# Patient Record
Sex: FEMALE | Race: WHITE | NOT HISPANIC OR LATINO | Employment: UNEMPLOYED | URBAN - METROPOLITAN AREA
[De-identification: names, ages, dates, MRNs, and addresses within clinical notes are randomized per-mention and may not be internally consistent; named-entity substitution may affect disease eponyms.]

---

## 2017-01-04 ENCOUNTER — HISTORICAL (OUTPATIENT)
Dept: WOUND CARE | Facility: HOSPITAL | Age: 27
End: 2017-01-04

## 2017-11-13 ENCOUNTER — HISTORICAL (OUTPATIENT)
Dept: LAB | Facility: HOSPITAL | Age: 27
End: 2017-11-13

## 2017-11-13 LAB
ABS NEUT (OLG): 3.1 X10(3)/MCL (ref 1.5–6.9)
ALBUMIN SERPL-MCNC: 3.9 GM/DL (ref 3.4–5)
ALBUMIN/GLOB SERPL: 1.1 RATIO
ALP SERPL-CCNC: 68 UNIT/L (ref 30–113)
ALT SERPL-CCNC: 42 UNIT/L (ref 10–45)
APPEARANCE, UA: CLEAR
AST SERPL-CCNC: 26 UNIT/L (ref 15–37)
BILIRUB SERPL-MCNC: 0.4 MG/DL (ref 0.1–0.9)
BILIRUB UR QL STRIP: NEGATIVE
BILIRUBIN DIRECT+TOT PNL SERPL-MCNC: 0.1 MG/DL (ref 0–0.3)
BILIRUBIN DIRECT+TOT PNL SERPL-MCNC: 0.3 MG/DL
BUN SERPL-MCNC: 10 MG/DL (ref 10–20)
CALCIUM SERPL-MCNC: 9.4 MG/DL (ref 8–10.5)
CHLORIDE SERPL-SCNC: 104 MMOL/L (ref 100–108)
CHOLEST SERPL-MCNC: 177 MG/DL (ref 140–200)
CHOLEST/HDLC SERPL: 4 MG/DL (ref 0–8)
CO2 SERPL-SCNC: 31 MMOL/L (ref 21–35)
COLOR UR: NORMAL
CREAT SERPL-MCNC: 0.84 MG/DL (ref 0.7–1.3)
DEPRECATED CALCIDIOL+CALCIFEROL SERPL-MC: 20.43 NG/ML (ref 30–80)
ERYTHROCYTE [DISTWIDTH] IN BLOOD BY AUTOMATED COUNT: 12.2 % (ref 11.5–17)
GLOBULIN SER-MCNC: 3.4 GM/DL
GLUCOSE (UA): NEGATIVE
GLUCOSE SERPL-MCNC: 93 MG/DL (ref 75–116)
HCT VFR BLD AUTO: 41.1 % (ref 36–48)
HDLC SERPL-MCNC: 40 MG/DL (ref 35–59)
HGB BLD-MCNC: 14.3 GM/DL (ref 12–16)
HGB UR QL STRIP: NEGATIVE
KETONES UR QL STRIP: NEGATIVE
LDLC SERPL CALC-MCNC: 117 MG/DL (ref 100–129)
LEUKOCYTE ESTERASE UR QL STRIP: NEGATIVE
MCH RBC QN AUTO: 32 PG (ref 27–34)
MCHC RBC AUTO-ENTMCNC: 35 GM/DL (ref 31–36)
MCV RBC AUTO: 91 FL (ref 80–99)
NITRITE UR QL STRIP: NEGATIVE
PH UR STRIP: 6.5 [PH]
PLATELET # BLD AUTO: 218 X10(3)/MCL (ref 140–400)
PMV BLD AUTO: 10.3 FL (ref 6.8–10)
POTASSIUM SERPL-SCNC: 4.1 MMOL/L (ref 3.6–5.2)
PROT SERPL-MCNC: 7.3 GM/DL (ref 6.4–8.2)
PROT UR QL STRIP: NEGATIVE
RBC # BLD AUTO: 4.53 X10(6)/MCL (ref 4.2–5.4)
SODIUM SERPL-SCNC: 140 MMOL/L (ref 135–145)
SP GR UR STRIP: <=1.005
T3FREE SERPL-MCNC: 3.25 PG/ML (ref 2.18–3.98)
T4 FREE SERPL-MCNC: 0.93 NG/DL (ref 0.76–1.46)
TRIGL SERPL-MCNC: 134 MG/DL (ref 35–150)
TSH SERPL-ACNC: 7.97 MIU/ML (ref 0.36–3.74)
UROBILINOGEN UR STRIP-ACNC: 0.2 EU/DL
VLDLC SERPL CALC-MCNC: 27 MG/DL
WBC # SPEC AUTO: 6.3 X10(3)/MCL (ref 4.5–11.5)

## 2021-12-06 ENCOUNTER — PATIENT MESSAGE (OUTPATIENT)
Dept: RESEARCH | Facility: HOSPITAL | Age: 31
End: 2021-12-06

## 2022-08-18 ENCOUNTER — HOSPITAL ENCOUNTER (EMERGENCY)
Facility: HOSPITAL | Age: 32
Discharge: HOME | End: 2022-08-18
Attending: EMERGENCY MEDICINE
Payer: MEDICAID

## 2022-08-18 VITALS
RESPIRATION RATE: 16 BRPM | TEMPERATURE: 99 F | HEIGHT: 65 IN | HEART RATE: 60 BPM | BODY MASS INDEX: 45.82 KG/M2 | OXYGEN SATURATION: 98 % | WEIGHT: 275 LBS | SYSTOLIC BLOOD PRESSURE: 138 MMHG | DIASTOLIC BLOOD PRESSURE: 103 MMHG

## 2022-08-18 DIAGNOSIS — K08.89 TOOTHACHE: Primary | ICD-10-CM

## 2022-08-18 PROCEDURE — 99284 EMERGENCY DEPT VISIT MOD MDM: CPT

## 2022-08-18 RX ORDER — AMOXICILLIN 875 MG/1
875 TABLET, FILM COATED ORAL 2 TIMES DAILY
COMMUNITY
Start: 2022-08-15

## 2022-08-18 RX ORDER — HYDROCODONE BITARTRATE AND ACETAMINOPHEN 10; 325 MG/1; MG/1
1 TABLET ORAL EVERY 6 HOURS PRN
Qty: 10 TABLET | Refills: 0 | OUTPATIENT
Start: 2022-08-18 | End: 2023-06-02

## 2022-08-18 RX ORDER — PANTOPRAZOLE SODIUM 40 MG/1
40 TABLET, DELAYED RELEASE ORAL DAILY
Qty: 15 TABLET | Refills: 0 | Status: SHIPPED | OUTPATIENT
Start: 2022-08-18 | End: 2022-09-02

## 2022-08-18 RX ORDER — ONDANSETRON 4 MG/1
4 TABLET, FILM COATED ORAL EVERY 6 HOURS
Qty: 12 TABLET | Refills: 0 | Status: SHIPPED | OUTPATIENT
Start: 2022-08-18

## 2022-08-18 RX ORDER — IBUPROFEN 800 MG/1
800 TABLET ORAL EVERY 8 HOURS PRN
COMMUNITY
Start: 2022-08-15

## 2022-08-18 NOTE — ED PROVIDER NOTES
Encounter Date: 8/18/2022       History     Chief Complaint   Patient presents with    Dental Pain     C/o dental pain. Has seen dentist and given motrin 800mg. Has surgery scheduled to remove bad teeth. Requesting stronger pain meds.     32-year-old female presents emergency department with her fiance complaining of severe left lower jaw tooth pain she has been on antibiotics and ibuprofen since Monday.  She came yesterday to be seen for the same issue.  But did not wait to be seen because she had waited a couple of hours and she felt that other people had more emergencies that she had.    She said her dentist does not want to prescribe her any stronger pain medication.  She has asked she tells me.  She tells me she is taking 800 mg of ibuprofen every 4 hours for the pain and 2 hours after that she is taking a 1000 mg of acetaminophen.  I did explain to the patient in plane strong language that she is going to hurt her liver with the acetaminophen and she is probably hurt her stomach with that much ibuprofen in addition to possible long-term renal kidney  Issues.    She said she just does not know what to do about the pain she has not contacted her family doctor Dr. Gerard tyler regarding any pain management.  She denies being allergic to any medications.  She does tell me that her stomach has been upset and she did vomit but she has not had any blood in her vomitus she has not had black or tarry stools.  She denies having any fever.  She has had previous extensive dental work.        Social history single lives with her fiance and 8-year-old child nobody else at the house is sick.  She does use tobacco she denies alcohol or drugs she is a full-time homemaker.    Past medical history aside from dental issues.  None.    Past surgical history multiple dental procedures and left ear surgery.    Vaccination history no COVID no pneumonia no fluid no tetanus  Mother is alive and healthy dad is dead from emphysema COPD  issues.    OB history  1 para 1 last cycle was 1 month ago normal she states.          Review of patient's allergies indicates:  No Known Allergies  History reviewed. No pertinent past medical history.  History reviewed. No pertinent surgical history.  History reviewed. No pertinent family history.  Social History     Tobacco Use    Smoking status: Current Every Day Smoker     Packs/day: 1.00    Smokeless tobacco: Never Used   Substance Use Topics    Alcohol use: Never    Drug use: Never     Review of Systems   Constitutional: Negative.  Negative for fever.   HENT: Positive for dental problem. Negative for sore throat.    Eyes: Negative.    Respiratory: Negative for shortness of breath.    Cardiovascular: Negative for chest pain.   Gastrointestinal: Positive for nausea and vomiting.   Endocrine: Negative.    Genitourinary: Negative for dysuria.   Musculoskeletal: Negative for back pain.   Skin: Negative for rash.   Allergic/Immunologic: Negative.    Neurological: Negative.  Negative for weakness.   Hematological: Does not bruise/bleed easily.   Psychiatric/Behavioral: Negative.    All other systems reviewed and are negative.      Physical Exam     Initial Vitals [22 0845]   BP Pulse Resp Temp SpO2   (!) 146/85 63 18 98.1 °F (36.7 °C) 98 %      MAP       --         Physical Exam    Nursing note and vitals reviewed.  Constitutional: She appears well-developed and well-nourished. She appears distressed.   Mildly obese white female became tearful and crying in explaining how bad her pain is.   HENT:   Head: Normocephalic and atraumatic.   Right Ear: Tympanic membrane and external ear normal.   Left Ear: Tympanic membrane and external ear normal.   Nose: Nose normal.   Mouth/Throat: Oropharynx is clear and moist and mucous membranes are normal.   There has been a lot of extractions of her teeth  The last molar that she has left on the left bottom side.  Half of it is missing.  It is tender to touch there  is no signs of large buccal abscess.   Eyes: EOM are normal. Pupils are equal, round, and reactive to light.   Neck: Neck supple. No thyromegaly present. No tracheal deviation present. No JVD present.   Normal range of motion.  Cardiovascular: Normal rate, regular rhythm and normal heart sounds. Exam reveals no gallop and no friction rub.    No murmur heard.  Pulmonary/Chest: Breath sounds normal. No stridor. No respiratory distress. She has no wheezes. She has no rhonchi. She has no rales. She exhibits no tenderness.   Abdominal: Abdomen is soft. Bowel sounds are normal. She exhibits no distension and no mass. There is no abdominal tenderness.   Musculoskeletal:         General: No tenderness or edema. Normal range of motion.      Cervical back: Normal range of motion and neck supple.     Lymphadenopathy:     She has no cervical adenopathy.   Neurological: She is alert and oriented to person, place, and time. She has normal strength and normal reflexes. No cranial nerve deficit. GCS score is 15. GCS eye subscore is 4. GCS verbal subscore is 5. GCS motor subscore is 6.   Skin: Skin is warm and dry. Capillary refill takes 2 to 3 seconds. No rash noted.   Psychiatric: She has a normal mood and affect. Her behavior is normal. Judgment and thought content normal.         ED Course   Procedures  Labs Reviewed - No data to display       Imaging Results    None          Medications - No data to display              ED Course as of 08/18/22 0909   u Aug 18, 2022   0907 Narcotics discussion was held with the patient I made it clear to the patient in the presence of her fiance that she cannot continue taking ibuprofen at that dose nor the acetaminophen at that does either.    I did caution her that the Norco I will prescribe is a short course and that she needs to talk to the dentist if she needs more medication or if she thinks the antibiotics need to be changed.  I explained to her that the Norco contains acetaminophen  also not to take Tylenol acetaminophen while taking the [place] [DM]      ED Course User Index  [DM] Dorian Campos MD             Clinical Impression:   Final diagnoses:  [K08.89] Toothache (Primary)          ED Disposition Condition    Discharge Stable        ED Prescriptions     Medication Sig Dispense Start Date End Date Auth. Provider    HYDROcodone-acetaminophen (NORCO)  mg per tablet Take 1 tablet by mouth every 6 (six) hours as needed for Pain (Do not drive after using medication at least 8 hours). 10 tablet 8/18/2022  Dorian Campos MD    ondansetron (ZOFRAN) 4 MG tablet Take 1 tablet (4 mg total) by mouth every 6 (six) hours. 12 tablet 8/18/2022  Dorian Campos MD    pantoprazole (PROTONIX) 40 MG tablet Take 1 tablet (40 mg total) by mouth once daily. for 15 days 15 tablet 8/18/2022 9/2/2022 Dorian Campos MD        Follow-up Information     Follow up With Specialties Details Why Contact Info      In 1 week             Dorian Capmos MD  08/18/22 0933

## 2022-08-18 NOTE — DISCHARGE INSTRUCTIONS
DO NOT TAKE  MG IBUPROFEN ANY MORE OFTEN THAN EVERY 8 HOURS WITH SOMETHING TO EAT     Please take the Protonix to help heal your stomach after all the ibuprofen     Do not take Tylenol when you are taking the narcotic pain medication because it contains Tylenol.    Do not ever take more than 2 g of Tylenol in a 24 hour.    You can contact your primary care doctor or contact her dentist if you need additional pain medication or a change in your antibiotics

## 2023-06-02 ENCOUNTER — HOSPITAL ENCOUNTER (EMERGENCY)
Facility: HOSPITAL | Age: 33
Discharge: HOME | End: 2023-06-02
Attending: EMERGENCY MEDICINE
Payer: MEDICAID

## 2023-06-02 VITALS
RESPIRATION RATE: 18 BRPM | TEMPERATURE: 98 F | HEIGHT: 66 IN | SYSTOLIC BLOOD PRESSURE: 162 MMHG | WEIGHT: 285.38 LBS | DIASTOLIC BLOOD PRESSURE: 96 MMHG | OXYGEN SATURATION: 97 % | BODY MASS INDEX: 45.87 KG/M2 | HEART RATE: 81 BPM

## 2023-06-02 DIAGNOSIS — S91.209A AVULSION OF TOENAIL, INITIAL ENCOUNTER: Primary | ICD-10-CM

## 2023-06-02 DIAGNOSIS — S92.421A CLOSED DISPLACED FRACTURE OF DISTAL PHALANX OF RIGHT GREAT TOE, INITIAL ENCOUNTER: ICD-10-CM

## 2023-06-02 PROCEDURE — 90471 IMMUNIZATION ADMIN: CPT | Performed by: EMERGENCY MEDICINE

## 2023-06-02 PROCEDURE — 63600175 PHARM REV CODE 636 W HCPCS: Performed by: EMERGENCY MEDICINE

## 2023-06-02 PROCEDURE — 90715 TDAP VACCINE 7 YRS/> IM: CPT | Performed by: EMERGENCY MEDICINE

## 2023-06-02 PROCEDURE — 25000003 PHARM REV CODE 250: Performed by: EMERGENCY MEDICINE

## 2023-06-02 PROCEDURE — 99284 EMERGENCY DEPT VISIT MOD MDM: CPT

## 2023-06-02 RX ORDER — HYDROCODONE BITARTRATE AND ACETAMINOPHEN 10; 325 MG/1; MG/1
1 TABLET ORAL EVERY 6 HOURS PRN
Qty: 20 TABLET | Refills: 0 | Status: SHIPPED | OUTPATIENT
Start: 2023-06-02 | End: 2023-06-07

## 2023-06-02 RX ORDER — CEFADROXIL 500 MG/1
500 CAPSULE ORAL EVERY 12 HOURS
Qty: 14 CAPSULE | Refills: 0 | Status: SHIPPED | OUTPATIENT
Start: 2023-06-02 | End: 2023-06-09

## 2023-06-02 RX ORDER — HYDROCODONE BITARTRATE AND ACETAMINOPHEN 10; 325 MG/1; MG/1
1 TABLET ORAL
Status: COMPLETED | OUTPATIENT
Start: 2023-06-02 | End: 2023-06-02

## 2023-06-02 RX ADMIN — HYDROCODONE BITARTRATE AND ACETAMINOPHEN 1 TABLET: 10; 325 TABLET ORAL at 07:06

## 2023-06-02 RX ADMIN — TETANUS TOXOID, REDUCED DIPHTHERIA TOXOID AND ACELLULAR PERTUSSIS VACCINE, ADSORBED 0.5 ML: 5; 2.5; 8; 8; 2.5 SUSPENSION INTRAMUSCULAR at 07:06

## 2023-06-02 NOTE — ED PROVIDER NOTES
Encounter Date: 6/2/2023       History     Chief Complaint   Patient presents with    Toe Injury     Chair fell on right great toe this morning. Laceration under toenail     The history is provided by the patient. No  was used.   Toe Injury  This is a new problem. The current episode started less than 1 hour ago. The problem occurs constantly. The problem has not changed since onset.Pertinent negatives include no chest pain and no shortness of breath. The symptoms are aggravated by walking and standing. Nothing relieves the symptoms.   Bar stool tipped over and landed on her right great toe.    Review of patient's allergies indicates:  No Known Allergies  History reviewed. No pertinent past medical history.  History reviewed. No pertinent surgical history.  History reviewed. No pertinent family history.  Social History     Tobacco Use    Smoking status: Every Day     Packs/day: 1.00     Types: Cigarettes    Smokeless tobacco: Never   Substance Use Topics    Alcohol use: Not Currently    Drug use: Never     Review of Systems   Constitutional:  Negative for fever.   HENT:  Negative for sore throat.    Respiratory:  Negative for shortness of breath.    Cardiovascular:  Negative for chest pain.   Gastrointestinal:  Negative for nausea.   Genitourinary:  Negative for dysuria.   Musculoskeletal:  Negative for back pain.   Skin:  Negative for rash.   Neurological:  Negative for weakness.   Hematological:  Does not bruise/bleed easily.     Physical Exam     Initial Vitals [06/02/23 0709]   BP Pulse Resp Temp SpO2   (!) 162/96 81 20 98.1 °F (36.7 °C) 97 %      MAP       --         Physical Exam    Nursing note and vitals reviewed.  Constitutional: She appears well-developed and well-nourished.   HENT:   Head: Normocephalic and atraumatic.   Right Ear: External ear normal.   Left Ear: External ear normal.   Eyes: Conjunctivae and EOM are normal. Pupils are equal, round, and reactive to light.   Neck: Neck  supple.   Normal range of motion.  Cardiovascular:  Normal rate, regular rhythm, normal heart sounds and intact distal pulses.           Pulmonary/Chest: Breath sounds normal.   Abdominal: Abdomen is soft. Bowel sounds are normal.   Musculoskeletal:         General: Normal range of motion.      Cervical back: Normal range of motion and neck supple.        Feet:      Neurological: She is alert and oriented to person, place, and time. GCS score is 15. GCS eye subscore is 4. GCS verbal subscore is 5. GCS motor subscore is 6.   Skin: Skin is warm and dry. Capillary refill takes less than 2 seconds.   Psychiatric: She has a normal mood and affect. Her behavior is normal. Judgment and thought content normal.       ED Course   Procedures  Labs Reviewed - No data to display       Imaging Results              X-Ray Toe 2 or More Views Right (Preliminary result)  Result time 06/02/23 07:49:42      Wet Read by Joe Molina MD (06/02/23 07:49:42, [facility] - Emergency Dept, Emergency Medicine)    Tuft Fx                                     Medications   HYDROcodone-acetaminophen  mg per tablet 1 tablet (1 tablet Oral Given 6/2/23 0729)   Tdap (BOOSTRIX) vaccine injection 0.5 mL (0.5 mLs Intramuscular Given 6/2/23 0729)      Pt does not desire nail removal.  I explained that she owuld likely lose the nail, in time, but should grow a new one.  Differential includes:  laceration, contusion, fracture, sprain.  Will give analgesic, update Tdap and obtain x-ray.                        Clinical Impression:   Final diagnoses:  [S91.209A] Avulsion of toenail, initial encounter (Primary)  [S92.421A] Closed displaced fracture of distal phalanx of right great toe, initial encounter        ED Disposition Condition    Discharge Stable          ED Prescriptions       Medication Sig Dispense Start Date End Date Auth. Provider    cefadroxil (DURICEF) 500 MG Cap Take 1 capsule (500 mg total) by mouth every 12  (twelve) hours. for 7 days 14 capsule 6/2/2023 6/9/2023 Joe Molina MD    HYDROcodone-acetaminophen (NORCO)  mg per tablet Take 1 tablet by mouth every 6 (six) hours as needed for Pain. 20 tablet 6/2/2023 6/7/2023 Joe Molina MD          Follow-up Information       Follow up With Specialties Details Why Contact Info    Follow up with your primary care provider in 2 weeks if not improved                 Joe Molina MD  06/02/23 0062

## 2023-07-11 ENCOUNTER — HOSPITAL ENCOUNTER (OUTPATIENT)
Dept: RADIOLOGY | Facility: HOSPITAL | Age: 33
Discharge: HOME | End: 2023-07-11
Attending: NURSE PRACTITIONER
Payer: MEDICAID

## 2023-07-11 DIAGNOSIS — Z72.0 TOBACCO ABUSE: ICD-10-CM

## 2023-07-11 PROCEDURE — 71046 X-RAY EXAM CHEST 2 VIEWS: CPT | Mod: TC

## 2024-07-25 ENCOUNTER — HOSPITAL ENCOUNTER (EMERGENCY)
Facility: HOSPITAL | Age: 34
Discharge: HOME | End: 2024-07-25
Attending: FAMILY MEDICINE
Payer: MEDICAID

## 2024-07-25 VITALS
BODY MASS INDEX: 48.82 KG/M2 | TEMPERATURE: 98 F | WEIGHT: 293 LBS | HEIGHT: 65 IN | DIASTOLIC BLOOD PRESSURE: 87 MMHG | OXYGEN SATURATION: 98 % | RESPIRATION RATE: 18 BRPM | SYSTOLIC BLOOD PRESSURE: 148 MMHG | HEART RATE: 69 BPM

## 2024-07-25 DIAGNOSIS — M54.41 BILATERAL LOW BACK PAIN WITH RIGHT-SIDED SCIATICA, UNSPECIFIED CHRONICITY: Primary | ICD-10-CM

## 2024-07-25 DIAGNOSIS — M51.36 DDD (DEGENERATIVE DISC DISEASE), LUMBAR: ICD-10-CM

## 2024-07-25 LAB
AMORPH PHOS CRY URNS QL MICRO: ABNORMAL /HPF
B-HCG UR QL: NEGATIVE
BACTERIA #/AREA URNS AUTO: ABNORMAL /HPF
BILIRUB UR QL STRIP.AUTO: NEGATIVE
CLARITY UR: ABNORMAL
COLOR UR AUTO: YELLOW
GLUCOSE UR QL STRIP: NEGATIVE
HGB UR QL STRIP: ABNORMAL
KETONES UR QL STRIP: NEGATIVE
LEUKOCYTE ESTERASE UR QL STRIP: NEGATIVE
NITRITE UR QL STRIP: NEGATIVE
PH UR STRIP: 7.5 [PH]
PROT UR QL STRIP: NEGATIVE
RBC #/AREA URNS AUTO: ABNORMAL /HPF
SP GR UR STRIP.AUTO: 1.02 (ref 1–1.03)
SQUAMOUS #/AREA URNS AUTO: ABNORMAL /HPF
UROBILINOGEN UR STRIP-ACNC: 0.2
WBC #/AREA URNS AUTO: ABNORMAL /HPF

## 2024-07-25 PROCEDURE — 81025 URINE PREGNANCY TEST: CPT

## 2024-07-25 PROCEDURE — 81001 URINALYSIS AUTO W/SCOPE: CPT

## 2024-07-25 PROCEDURE — 81003 URINALYSIS AUTO W/O SCOPE: CPT

## 2024-07-25 PROCEDURE — 63600175 PHARM REV CODE 636 W HCPCS

## 2024-07-25 PROCEDURE — 96372 THER/PROPH/DIAG INJ SC/IM: CPT

## 2024-07-25 PROCEDURE — 99285 EMERGENCY DEPT VISIT HI MDM: CPT | Mod: 25

## 2024-07-25 RX ORDER — KETOROLAC TROMETHAMINE 10 MG/1
10 TABLET, FILM COATED ORAL EVERY 6 HOURS
Qty: 20 TABLET | Refills: 0 | Status: SHIPPED | OUTPATIENT
Start: 2024-07-25 | End: 2024-07-30

## 2024-07-25 RX ORDER — CYCLOBENZAPRINE HCL 10 MG
10 TABLET ORAL 3 TIMES DAILY PRN
Qty: 15 TABLET | Refills: 0 | Status: SHIPPED | OUTPATIENT
Start: 2024-07-25 | End: 2024-07-30

## 2024-07-25 RX ORDER — KETOROLAC TROMETHAMINE 30 MG/ML
30 INJECTION, SOLUTION INTRAMUSCULAR; INTRAVENOUS
Status: COMPLETED | OUTPATIENT
Start: 2024-07-25 | End: 2024-07-25

## 2024-07-25 RX ADMIN — KETOROLAC TROMETHAMINE 30 MG: 30 INJECTION, SOLUTION INTRAMUSCULAR at 06:07

## 2024-07-25 NOTE — ED PROVIDER NOTES
Encounter Date: 7/25/2024       History     Chief Complaint   Patient presents with    Back Pain     Pt c/o lower back pain since July 1, denies any injury. Pain across lower back and into rt hip.     See MDM.     The history is provided by the patient. No  was used.     Review of patient's allergies indicates:  No Known Allergies  No past medical history on file.  No past surgical history on file.  No family history on file.  Social History     Tobacco Use    Smoking status: Every Day     Current packs/day: 1.00     Types: Cigarettes    Smokeless tobacco: Never   Substance Use Topics    Alcohol use: Not Currently    Drug use: Never     Review of Systems   Constitutional:  Negative for chills and fever.   Gastrointestinal:  Negative for nausea and vomiting.   Musculoskeletal:  Positive for back pain. Negative for neck pain and neck stiffness.   Neurological:  Negative for weakness.   All other systems reviewed and are negative.      Physical Exam     Initial Vitals [07/25/24 1711]   BP Pulse Resp Temp SpO2   (!) 148/87 69 18 97.7 °F (36.5 °C) 98 %      MAP       --         Physical Exam    Nursing note and vitals reviewed.  Constitutional: She appears well-developed and well-nourished. She is not diaphoretic. No distress.   HENT:   Head: Normocephalic and atraumatic.   Cardiovascular:  Normal rate.           Pulmonary/Chest: No respiratory distress.   Musculoskeletal:      Cervical back: Normal.      Thoracic back: Normal.      Comments: Pt is ambulatory in ED  L2-L4 midline and bilateral lateral tenderness to palpation. No lateral hip tenderness to palpation. No effusion, swelling, ecchymosis, effusion      Neurological: She is alert and oriented to person, place, and time.   Skin: Skin is warm and dry.   Psychiatric: She has a normal mood and affect. Her behavior is normal.         ED Course   Procedures  Labs Reviewed   URINALYSIS - Abnormal       Result Value    Color, UA Yellow       Appearance, UA Cloudy (*)     Specific Gravity, UA 1.020      pH, UA 7.5      Protein, UA Negative      Glucose, UA Negative      Ketones, UA Negative      Blood, UA Trace-Intact (*)     Bilirubin, UA Negative      Urobilinogen, UA 0.2      Nitrites, UA Negative      Leukocyte Esterase, UA Negative     URINALYSIS, MICROSCOPIC - Abnormal    Bacteria, UA Rare      Amorphous Phosphate Crystals, UA Few (*)     RBC, UA 0-5      WBC, UA None Seen      Squamous Epithelial Cells, UA Few (*)    PREGNANCY TEST, URINE RAPID - Normal    hCG Qualitative, Urine Negative            Imaging Results              CT Lumbar Spine Without Contrast (Final result)  Result time 07/25/24 18:41:59      Final result by Kevin Hall MD (07/25/24 18:41:59)                   Impression:      No acute osseous findings.  Degenerative changes as described in the body of the report.      Electronically signed by: Kevin Hall  Date:    07/25/2024  Time:    18:41               Narrative:    EXAMINATION:  CT LUMBAR SPINE WITHOUT CONTRAST    CLINICAL HISTORY:  Lumbar radiculopathy, symptoms persist with conservative treatment;    TECHNIQUE:  Low-dose axial, sagittal and coronal reformations are obtained through the lumbar spine.  Contrast was not administered. Dose reduction techniques including automatic exposure control (AEC) were utilized.  Dose (DLP): 18 80 mGycm    COMPARISON:  None.    FINDINGS:  Five lumbar-type vertebral bodies.    Vertebral column alignment is normal.  No fractures demonstrated.  Vertebral body heights are preserved.    There is mild multilevel lumbar facet arthropathy.  Mild disc height loss of L2-L3, L3-L4, and L4-L5 with mild circumferential disc bulges.  No high grade spinal canal or foraminal stenosis.    Paravertebral soft tissues are normal.                                       Medications   ketorolac injection 30 mg (30 mg Intramuscular Given 7/25/24 1839)     Medical Decision Making  Pt is a 33 y/o female who  "presents for low back pain x3 weeks. States that the pain started on 7/1/24 without injury. Has been gradually getting worse. Pain is low back, midline and lateral. States that the pain feels like a "sharp hot knife stabbing" her in the middle of her back. Rates pain a 8-9/10. Has been taking ibuprofen for the pain without significant relief. Has also been doing ice packs, warm compresses, and siatic stretches. States that the pain radiates to the right hip and down the back of the leg. Denies previous back pain. Denies numbness/tingling, bowel/bladder incontinence, saddle anesthesia. Pt notes menstrual cycle is going to start today or tomorrow.     Patient is not toxic appearing, in no acute distress. Ambulatory in ED. Toradol improves pain slightly. No signs of infection, stones or other abnormalties on UA. Multilevel degenerative changes with circumferential bulging discs from L2-L5 level. Advised patient to follow-up with primary care, has appt tomorrow, for referral to physical therapy. Continue toradol as needed for pain, if no relief can take Flexeril. Instructed patient to return with bowel/bladder incontinence, saddle anesthesia, worsening symptoms. Patient expressed undertanding and was in agreement with the plan.     Amount and/or Complexity of Data Reviewed  Labs: ordered. Decision-making details documented in ED Course.  Radiology: ordered. Decision-making details documented in ED Course.    Risk  Prescription drug management.      Additional MDM:   Differential Diagnosis:   Other: The following diagnoses were also considered and will be evaluated: uti, ureterolithiasis and lumbar strain.                                   Clinical Impression:  Final diagnoses:  [M54.41] Bilateral low back pain with right-sided sciatica, unspecified chronicity (Primary)  [M51.36] DDD (degenerative disc disease), lumbar          ED Disposition Condition    Discharge Stable          ED Prescriptions       Medication Sig " Dispense Start Date End Date Auth. Provider    cyclobenzaprine (FLEXERIL) 10 MG tablet Take 1 tablet (10 mg total) by mouth 3 (three) times daily as needed for Muscle spasms. 15 tablet 7/25/2024 7/30/2024 Denisha Frankel PA    ketorolac (TORADOL) 10 mg tablet Take 1 tablet (10 mg total) by mouth every 6 (six) hours. for 5 days 20 tablet 7/25/2024 7/30/2024 Denisha Frankel PA          Follow-up Information       Follow up With Specialties Details Why Contact Info    Adria García NP Family Medicine Call in 1 week  [address] [place] [phone]  [phone]               Denisha Frankel PA  07/25/24 4002

## 2024-07-26 NOTE — DISCHARGE INSTRUCTIONS
Toradol as needed for pain, alternate with tylenol. If no improvement can take a Flexeril. Continue heat pads, staying mobile. Follow-up with primary care for physical therapy referral. Return with new or worsening symptoms.